# Patient Record
Sex: MALE | Race: WHITE | NOT HISPANIC OR LATINO | Employment: UNEMPLOYED | URBAN - METROPOLITAN AREA
[De-identification: names, ages, dates, MRNs, and addresses within clinical notes are randomized per-mention and may not be internally consistent; named-entity substitution may affect disease eponyms.]

---

## 2021-03-31 ENCOUNTER — AMBULATORY - HEALTHEAST (OUTPATIENT)
Dept: BEHAVIORAL HEALTH | Facility: CLINIC | Age: 31
End: 2021-03-31

## 2021-03-31 ENCOUNTER — AMBULATORY - HEALTHEAST (OUTPATIENT)
Dept: CARE COORDINATION | Facility: HOSPITAL | Age: 31
End: 2021-03-31

## 2021-06-16 PROBLEM — F41.9 ANXIETY: Chronic | Status: ACTIVE | Noted: 2021-04-01

## 2021-06-16 PROBLEM — F20.0 CHRONIC PARANOID SCHIZOPHRENIA (H): Chronic | Status: ACTIVE | Noted: 2021-04-01

## 2021-06-16 PROBLEM — G47.00 INSOMNIA: Chronic | Status: ACTIVE | Noted: 2021-04-01

## 2021-06-16 PROBLEM — F12.20 SEVERE CANNABIS USE DISORDER (H): Chronic | Status: ACTIVE | Noted: 2021-04-01

## 2021-06-16 PROBLEM — R62.50 DEVELOPMENT DELAY: Chronic | Status: ACTIVE | Noted: 2021-04-01
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
